# Patient Record
Sex: MALE | Race: WHITE | NOT HISPANIC OR LATINO | URBAN - METROPOLITAN AREA
[De-identification: names, ages, dates, MRNs, and addresses within clinical notes are randomized per-mention and may not be internally consistent; named-entity substitution may affect disease eponyms.]

---

## 2020-06-27 ENCOUNTER — OFFICE VISIT (OUTPATIENT)
Dept: FAMILY MEDICINE | Facility: CLINIC | Age: 25
End: 2020-06-27
Payer: COMMERCIAL

## 2020-06-27 DIAGNOSIS — R41.840 INATTENTION: Primary | ICD-10-CM

## 2020-06-27 PROCEDURE — 99203 OFFICE O/P NEW LOW 30 MIN: CPT | Mod: 95,,, | Performed by: FAMILY MEDICINE

## 2020-06-27 PROCEDURE — 99203 PR OFFICE/OUTPT VISIT, NEW, LEVL III, 30-44 MIN: ICD-10-PCS | Mod: 95,,, | Performed by: FAMILY MEDICINE

## 2020-06-29 NOTE — PROGRESS NOTES
The patient location is: [place]  The chief complaint leading to consultation is: Focus problems    Visit type: audiovisual    Face to Face time with patient: 20 minutes  25 minutes minutes of total time spent on the encounter, which includes face to face time and non-face to face time preparing to see the patient (eg, review of tests), Obtaining and/or reviewing separately obtained history, Documenting clinical information in the electronic or other health record, Independently interpreting results (not separately reported) and communicating results to the patient/family/caregiver, or Care coordination (not separately reported).         Each patient to whom he or she provides medical services by telemedicine is:  (1) informed of the relationship between the physician and patient and the respective role of any other health care provider with respect to management of the patient; and (2) notified that he or she may decline to receive medical services by telemedicine and may withdraw from such care at any time.    Notes:     Patient has virtual visit today from [facility] in order to discuss issues with inattentiveness and focus while at work in school.  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  This is making his school and works a for as result.  He is unsure if he has ever been diagnosed with an ADHD or similar disorder as a child.  He does remember that his mom had him on a medication at one point when he was 11 years old that she states made him look like a zombie so she took him off.  He does not remember what it was or what it was for.  He has an appointment next week with his regular PCP for the same issue but he was hoping to have an earlier appointment to get something prescribed sooner.    Answers for HPI/ROS submitted by the patient on 6/26/2020   activity change: Yes  unexpected weight change: No  neck  pain: No  hearing loss: No  rhinorrhea: No  trouble swallowing: No  eye discharge: No  visual disturbance: No  chest tightness: No  wheezing: No  chest pain: No  palpitations: No  blood in stool: No  constipation: No  vomiting: No  diarrhea: No  polydipsia: No  polyuria: No  difficulty urinating: No  urgency: No  hematuria: No  joint swelling: No  arthralgias: No  headaches: No  weakness: No  confusion: No  dysphoric mood: No    Explained to patient that inadequate diagnosis for ADHD would not be able to be done with virtual visit without a full screen history, psych history, and family history.  And amphetamine would also not be able to be prescribed without conducting a physical exam especially a cardiac exam.  It also would not make a difference if he had medication a few days early from his regular PCP appointment as ADHD treatment does not work that quickly.  He expressed understanding of this and will follow-up with his regular PCP next week.